# Patient Record
Sex: FEMALE | Race: BLACK OR AFRICAN AMERICAN | NOT HISPANIC OR LATINO | URBAN - NONMETROPOLITAN AREA
[De-identification: names, ages, dates, MRNs, and addresses within clinical notes are randomized per-mention and may not be internally consistent; named-entity substitution may affect disease eponyms.]

---

## 2019-01-25 ENCOUNTER — IMPORTED ENCOUNTER (OUTPATIENT)
Dept: URBAN - NONMETROPOLITAN AREA CLINIC 1 | Facility: CLINIC | Age: 59
End: 2019-01-25

## 2019-01-25 PROBLEM — H25.13: Noted: 2019-01-25

## 2019-01-25 PROBLEM — E11.9: Noted: 2019-01-25

## 2019-01-25 PROBLEM — H40.013: Noted: 2019-01-25

## 2019-01-25 PROBLEM — H52.4: Noted: 2019-01-25

## 2019-01-25 PROCEDURE — 92014 COMPRE OPH EXAM EST PT 1/>: CPT

## 2019-01-25 PROCEDURE — 92340 FIT SPECTACLES MONOFOCAL: CPT

## 2019-01-25 NOTE — PATIENT DISCUSSION
Presbyopia OUDiscussed refractive status in detail with patient. New glasses Rx given today. Continue to monitor. Heron OUDiscussed diagnosis with patient. Reviewed symptoms related to cataract progression. Discussed various treatment options with patient. Recommend cataract evaluation by Dr. Suly Whiting. Patient defers treatment at this time. Continue to monitor. IDDM sans Retinopathy Discussed diagnosis with patient. Discussed the risk of diabetic damage of the retina with potential vision loss and the importance of routine follow-up. Emphasized strict blood sugar control. Continue to monitor. Glaucoma suspect OUDiscussed diagnosis in detail with patient. No family history of disease. IOP at 16 OD and 15 OS Cup to disc noted at 0.5 OD and 0.55 OSContinue to monitor. RTC in 6 months with VF 24-2 and OCT; Dr's Notes: [id number] VF

## 2021-08-18 NOTE — PATIENT DISCUSSION
DISCUSSED AT LENGTH W PT THE DX, IMAGES, PROGNOSIS AND NEED FOR F/U. RISK OF VL: LOW/MOD. ALL QUESTIONS WERE ANSWERED.

## 2021-08-18 NOTE — PATIENT DISCUSSION
8/18/21: Reassured that this condition is not papilledema and does not require further work up. PT WITH OND MAY HAVE INTERMITTENT VF DEFICITS AS WELL AS TVL. PT DENIES BOTH. MONITOR.

## 2022-04-10 ASSESSMENT — VISUAL ACUITY
OS_PH: 20/40
OD_PH: 20/50
OS_CC: 20/100
OD_CC: 20/300

## 2022-04-10 ASSESSMENT — TONOMETRY
OS_IOP_MMHG: 15
OD_IOP_MMHG: 16